# Patient Record
Sex: MALE | URBAN - NONMETROPOLITAN AREA
[De-identification: names, ages, dates, MRNs, and addresses within clinical notes are randomized per-mention and may not be internally consistent; named-entity substitution may affect disease eponyms.]

---

## 2020-10-20 ENCOUNTER — APPOINTMENT (RX ONLY)
Dept: URBAN - NONMETROPOLITAN AREA CLINIC 25 | Facility: CLINIC | Age: 45
Setting detail: DERMATOLOGY
End: 2020-10-20

## 2020-10-20 VITALS — TEMPERATURE: 98.8 F

## 2020-10-20 DIAGNOSIS — L21.8 OTHER SEBORRHEIC DERMATITIS: ICD-10-CM

## 2020-10-20 PROCEDURE — 99202 OFFICE O/P NEW SF 15 MIN: CPT

## 2020-10-20 PROCEDURE — ? IN-HOUSE DISPENSING PHARMACY

## 2020-10-20 PROCEDURE — ? COUNSELING

## 2020-10-20 ASSESSMENT — LOCATION DETAILED DESCRIPTION DERM: LOCATION DETAILED: RIGHT MEDIAL FRONTAL SCALP

## 2020-10-20 ASSESSMENT — LOCATION SIMPLE DESCRIPTION DERM: LOCATION SIMPLE: RIGHT SCALP

## 2020-10-20 ASSESSMENT — LOCATION ZONE DERM: LOCATION ZONE: SCALP

## 2020-10-20 NOTE — PROCEDURE: IN-HOUSE DISPENSING PHARMACY
Product 34 Price/Unit (In Dollars): 0
Product 54 Unit Type: mg
Product 26 Refills: 2
Product 1 Unit Type: grams
Name Of Product 4: BP 2.5% / Clindamycin Combination Gel
Product 61 Price/Unit (In Dollars): 45.00
Product 1 Refills: 1
Name Of Product 62: Arnica bruise cream
Name Of Product 63: Hydrating tretinoin 0.1%
Product 4 Application Directions: Apply once daily in the AM
Name Of Product 44: Anti-aging Body Tretinoin
Name Of Product 33: Seborrheic Dermatitis Shampoo 120ml
Product 3 Application Directions: Apply pea sized amount nightly
Product 48 Application Directions: Wash scalp 3 times weekly let sit for 5 minutes then rinse
Name Of Product 1: Sodium sulfacetamide 8%
Name Of Product 24: Fluocinolone Scalp and Body Oil
Product 2 Price/Unit (In Dollars): 50.00
Name Of Product 8: Tretinoin 0.025% / Clindamycin Combination Cream
Product 9 Application Directions: Apply pea sized amount to affected areas 2-3 nights per wk, work up to QHS.
Name Of Product 27: Hydrocortisone 2.5% / Tranilast 0.5% / Levo 2%
Name Of Product 51: Hydrating tretinoin 0.05%
Name Of Product 43: Alopecia Topical Solution for Men
Product 27 Amount/Unit (Numbers Only): 30
Product 28 Price/Unit (In Dollars): 30.00
Product 26 Application Directions: Apply bid for 2 weeks
Product 10 Price/Unit (In Dollars): 50
Product 27 Application Directions: Apply twice daily to affected areas for up to 2 weeks/month PRN.
Product 11 Application Directions: Apply daily in AM
Render Product Pricing In Note: Yes
Product 33 Application Directions: AppLy to scalp 3 times per week let sit 5 minutes then rinse
Name Of Product 3: Adapalene 0.3% Gel
Product 42 Application Directions: Apply topically to face once nightly, wash in AM. Wear sunscreen
Product 1 Amount/Unit (Numbers Only): 120
Product 23 Application Directions: Apply to affected areas daily up to two weeks per month
Name Of Product 32: Tacrolimus 0.1% Ointment
Name Of Product 2: Acne Triple Gel Combination
Name Of Product 61: Hydrating tretinoin 0.025%
Product 46 Refills: 3
Product 31 Price/Unit (In Dollars): 40.00
Name Of Product 10: Female Hormonal Acne Gel
Name Of Product 7: Dapsone 6% Gel
Product 5 Price/Unit (In Dollars): 0.00
Name Of Product 30: Clobetasol solution
Product 29 Price/Unit (In Dollars): 65.00
Product 24 Application Directions: Spray on shoulders after shower
Product 44 Application Directions: Apply lightly once nightly
Name Of Product 29: Triamcinolone Cream (120 grams)
Product 32 Refills: 5
Name Of Product 23: Clobetasol Ointment
Name Of Product 45: Skin brightening hydroquinone 8% combination sensitive skin
Product 5 Application Directions: Wash face daily
Product 24 Amount/Unit (Numbers Only): 60
Product 62 Application Directions: Apply daily to legs
Name Of Product 31: Urea 40% cream
Name Of Product 12: Rosacea Triple Combination Gel
Name Of Product 13: Rosacea Cream
Product 12 Application Directions: Apply pea sized amount to face BID
Name Of Product 26: Ketoconazole and hydrocortisone cream
Name Of Product 46: Anti fungal nail solution
Product 22 Application Directions: Apply twice a day for 2 weeks then stop for 1 week.
Product 2 Application Directions: Apply a pea-sized amount to entire face once nightly.
Name Of Product 21: Anti-Fungal Shampoo
Name Of Product 48: Seborrheic Dermatitis Shampoo
Name Of Product 6: Clindamycin Gel
Product 31 Application Directions: Apply to affected area twice daily for two weeks.
Product 10 Application Directions: Apply to face in AM.
Name Of Product 9: Hydrating 0.05% Tretinoin Cream
Name Of Product 11: Metronidazole Gel
Product 32 Unit Type: tube(s)
Product 29 Application Directions: Apply to affected areas bid for two weeks per month.
Product 44 Amount/Unit (Numbers Only): 240
Product 30 Application Directions: Apply BID to scalp x 2 weeks
Product 44 Price/Unit (In Dollars): 90.00
Product 43 Application Directions: Spray on scalp once daily
Product 21 Application Directions: Lather to ear 3 times weekly as needed
Detail Level: Zone
Name Of Product 25: Fluocinonide Cream
Product 1 Application Directions: Wash face every morning
Name Of Product 28: Triamcinolone Cream (30 gram)
Name Of Product 22: Clobetasol Cream
Name Of Product 42: Skin Brightening Hydroquinone 8% Combination
Name Of Product 5: BP 8% Acne Wash
Product 61 Application Directions: Apply pea sized amount nightly to face
Name Of Product 41: AK Imiquimod Gel
Product 46 Application Directions: Apply to affected nails daily x 12 weeks

## 2023-02-14 ENCOUNTER — APPOINTMENT (RX ONLY)
Dept: URBAN - NONMETROPOLITAN AREA CLINIC 25 | Facility: CLINIC | Age: 48
Setting detail: DERMATOLOGY
End: 2023-02-14

## 2023-02-14 DIAGNOSIS — L21.8 OTHER SEBORRHEIC DERMATITIS: ICD-10-CM

## 2023-02-14 DIAGNOSIS — L20.89 OTHER ATOPIC DERMATITIS: ICD-10-CM | Status: INADEQUATELY CONTROLLED

## 2023-02-14 PROBLEM — L20.84 INTRINSIC (ALLERGIC) ECZEMA: Status: ACTIVE | Noted: 2023-02-14

## 2023-02-14 PROCEDURE — ? COUNSELING

## 2023-02-14 PROCEDURE — ? PRESCRIPTION

## 2023-02-14 PROCEDURE — ? IN-HOUSE DISPENSING PHARMACY

## 2023-02-14 PROCEDURE — 99213 OFFICE O/P EST LOW 20 MIN: CPT

## 2023-02-14 PROCEDURE — ? PATIENT SPECIFIC COUNSELING

## 2023-02-14 PROCEDURE — ? INVENTORY

## 2023-02-14 RX ORDER — TRIAMCINOLONE ACETONIDE 1 MG/G
CREAM TOPICAL
Qty: 454 | Refills: 0 | Status: ERX | COMMUNITY
Start: 2023-02-14

## 2023-02-14 RX ADMIN — TRIAMCINOLONE ACETONIDE: 1 CREAM TOPICAL at 00:00

## 2023-02-14 ASSESSMENT — LOCATION DETAILED DESCRIPTION DERM
LOCATION DETAILED: LEFT ANTECUBITAL SKIN
LOCATION DETAILED: HAIR

## 2023-02-14 ASSESSMENT — LOCATION SIMPLE DESCRIPTION DERM
LOCATION SIMPLE: HAIR
LOCATION SIMPLE: LEFT ELBOW

## 2023-02-14 ASSESSMENT — ITCH NUMERIC RATING SCALE: HOW SEVERE IS YOUR ITCHING?: 2

## 2023-02-14 ASSESSMENT — LOCATION ZONE DERM
LOCATION ZONE: ARM
LOCATION ZONE: SCALP

## 2023-02-14 ASSESSMENT — BSA RASH: BSA RASH: 8

## 2023-02-14 NOTE — PROCEDURE: IN-HOUSE DISPENSING PHARMACY
Product 34 Price/Unit (In Dollars): 0
Product 61 Unit Type: mg
Product 7 Price/Unit (In Dollars): 47.00
Product 9 Application Directions: Apply to affected areas on scalp 1-2 times per day massage into scalp
Name Of Product 5: Skin brightening with tretinoin
Name Of Product 10: Seborrheic dermatitis shampoo
Product 5 Application Directions: Apply to entire face every other night work up to nightly use moisturizer
Name Of Product 8: Anti fungal nail solution
Name Of Product 2: Hydrating tretinoin 0.025%
Product 10 Amount/Unit (Numbers Only): 47
Product 8 Application Directions: Apply to affected nails daily x 3 months
Name Of Product 4: Hydrating tretinoin 0.1%
Name Of Product 9: Alopecia solution for Men
Product 11 Application Directions: Apply to affected areas BID
Render Refills If Set To 0: Yes
Name Of Product 7: Rosacea triple gel
Name Of Product 1: Acne triple gel
Name Of Product 3: Hydrating tretinoin 0.05%
Product 5 Refills: 3
Product 10 Price/Unit (In Dollars): 1
Product 10 Application Directions: Lather to scalp daily x 2 weeks then 2-3 times per week for maintenance let sit 5 minutes then rinse
Name Of Product 6: Skin brightening sensitive skin
Detail Level: Zone
Product 6 Application Directions: Apply pea sized amount to face nightly
Name Of Product 11: Arnica bruise cream

## 2025-03-31 ENCOUNTER — APPOINTMENT (OUTPATIENT)
Dept: URBAN - NONMETROPOLITAN AREA CLINIC 25 | Facility: CLINIC | Age: 50
Setting detail: DERMATOLOGY
End: 2025-03-31

## 2025-03-31 DIAGNOSIS — D22 MELANOCYTIC NEVI: ICD-10-CM

## 2025-03-31 DIAGNOSIS — Z71.89 OTHER SPECIFIED COUNSELING: ICD-10-CM

## 2025-03-31 DIAGNOSIS — L20.89 OTHER ATOPIC DERMATITIS: ICD-10-CM

## 2025-03-31 DIAGNOSIS — D18.0 HEMANGIOMA: ICD-10-CM

## 2025-03-31 DIAGNOSIS — L57.0 ACTINIC KERATOSIS: ICD-10-CM

## 2025-03-31 DIAGNOSIS — L57.8 OTHER SKIN CHANGES DUE TO CHRONIC EXPOSURE TO NONIONIZING RADIATION: ICD-10-CM

## 2025-03-31 DIAGNOSIS — L82.1 OTHER SEBORRHEIC KERATOSIS: ICD-10-CM

## 2025-03-31 PROBLEM — D22.5 MELANOCYTIC NEVI OF TRUNK: Status: ACTIVE | Noted: 2025-03-31

## 2025-03-31 PROBLEM — D18.01 HEMANGIOMA OF SKIN AND SUBCUTANEOUS TISSUE: Status: ACTIVE | Noted: 2025-03-31

## 2025-03-31 PROCEDURE — 17000 DESTRUCT PREMALG LESION: CPT

## 2025-03-31 PROCEDURE — 99214 OFFICE O/P EST MOD 30 MIN: CPT | Mod: 25

## 2025-03-31 PROCEDURE — ? LIQUID NITROGEN

## 2025-03-31 PROCEDURE — ? PRESCRIPTION

## 2025-03-31 PROCEDURE — ? COUNSELING

## 2025-03-31 PROCEDURE — 17003 DESTRUCT PREMALG LES 2-14: CPT

## 2025-03-31 PROCEDURE — ? PRESCRIPTION MEDICATION MANAGEMENT

## 2025-03-31 RX ORDER — TRIAMCINOLONE ACETONIDE 1 MG/G
OINTMENT TOPICAL BID PRN
Qty: 453.6 | Refills: 1 | Status: ERX | COMMUNITY
Start: 2025-03-31

## 2025-03-31 RX ADMIN — TRIAMCINOLONE ACETONIDE: 1 OINTMENT TOPICAL at 00:00

## 2025-03-31 ASSESSMENT — LOCATION SIMPLE DESCRIPTION DERM
LOCATION SIMPLE: LEFT UPPER BACK
LOCATION SIMPLE: NOSE
LOCATION SIMPLE: RIGHT CHEEK
LOCATION SIMPLE: LEFT TEMPLE
LOCATION SIMPLE: RIGHT FOREHEAD
LOCATION SIMPLE: LEFT THIGH
LOCATION SIMPLE: ABDOMEN

## 2025-03-31 ASSESSMENT — LOCATION DETAILED DESCRIPTION DERM
LOCATION DETAILED: LEFT SUPERIOR TEMPLE
LOCATION DETAILED: LEFT INFERIOR TEMPLE
LOCATION DETAILED: LEFT ANTERIOR PROXIMAL THIGH
LOCATION DETAILED: RIGHT FOREHEAD
LOCATION DETAILED: RIGHT SUPERIOR LATERAL MALAR CHEEK
LOCATION DETAILED: NASAL SUPRATIP
LOCATION DETAILED: LEFT LATERAL ABDOMEN
LOCATION DETAILED: LEFT MID-UPPER BACK

## 2025-03-31 ASSESSMENT — LOCATION ZONE DERM
LOCATION ZONE: FACE
LOCATION ZONE: LEG
LOCATION ZONE: NOSE
LOCATION ZONE: TRUNK

## 2025-03-31 NOTE — PROCEDURE: PRESCRIPTION MEDICATION MANAGEMENT
Detail Level: Zone
Render In Strict Bullet Format?: No
Modify Regimen: Triamcinolone cream -> triamcinolone ointment

## 2025-03-31 NOTE — PROCEDURE: LIQUID NITROGEN
Render Post-Care Instructions In Note?: no
Application Tool (Optional): Cry-AC
Show Aperture Variable?: Yes
Duration Of Freeze Thaw-Cycle (Seconds): 3
Detail Level: Detailed
Number Of Freeze-Thaw Cycles: 2 freeze-thaw cycles
Post-Care Instructions: I reviewed with the patient in detail post-care instructions. Patient is to wear sunprotection, and avoid picking at any of the treated lesions. Pt may apply Vaseline to crusted or scabbing areas.
Consent: The patient's consent was obtained including but not limited to risks of crusting, scabbing, blistering, scarring, darker or lighter pigmentary change, recurrence, incomplete removal and infection.